# Patient Record
Sex: MALE | Race: WHITE | Employment: FULL TIME | URBAN - METROPOLITAN AREA
[De-identification: names, ages, dates, MRNs, and addresses within clinical notes are randomized per-mention and may not be internally consistent; named-entity substitution may affect disease eponyms.]

---

## 2023-02-15 ENCOUNTER — APPOINTMENT (OUTPATIENT)
Dept: GENERAL RADIOLOGY | Facility: HOSPITAL | Age: 23
End: 2023-02-15
Attending: EMERGENCY MEDICINE
Payer: COMMERCIAL

## 2023-02-15 ENCOUNTER — HOSPITAL ENCOUNTER (EMERGENCY)
Facility: HOSPITAL | Age: 23
Discharge: HOME | End: 2023-02-15
Attending: EMERGENCY MEDICINE
Payer: COMMERCIAL

## 2023-02-15 ENCOUNTER — APPOINTMENT (OUTPATIENT)
Dept: CT IMAGING | Facility: HOSPITAL | Age: 23
End: 2023-02-15
Attending: EMERGENCY MEDICINE
Payer: COMMERCIAL

## 2023-02-15 VITALS
BODY MASS INDEX: 26.21 KG/M2 | DIASTOLIC BLOOD PRESSURE: 87 MMHG | HEART RATE: 60 BPM | RESPIRATION RATE: 17 BRPM | TEMPERATURE: 98 F | HEIGHT: 67 IN | OXYGEN SATURATION: 98 % | SYSTOLIC BLOOD PRESSURE: 149 MMHG | WEIGHT: 167 LBS

## 2023-02-15 DIAGNOSIS — R11.2 NAUSEA AND VOMITING IN ADULT: Primary | ICD-10-CM

## 2023-02-15 LAB
ALBUMIN SERPL-MCNC: 4.7 G/DL (ref 3.4–5)
ALBUMIN/GLOB SERPL: 1.5 {RATIO} (ref 1–2)
ALP LIVER SERPL-CCNC: 83 U/L
ALT SERPL-CCNC: 27 U/L
AMPHET UR QL SCN: NEGATIVE
ANION GAP SERPL CALC-SCNC: 6 MMOL/L (ref 0–18)
AST SERPL-CCNC: 15 U/L (ref 15–37)
BARBITURATES UR QL SCN: NEGATIVE
BASOPHILS # BLD AUTO: 0.04 X10(3) UL (ref 0–0.2)
BASOPHILS NFR BLD AUTO: 0.3 %
BENZODIAZ UR QL SCN: NEGATIVE
BILIRUB SERPL-MCNC: 1.8 MG/DL (ref 0.1–2)
BILIRUB UR QL: NEGATIVE
BUN BLD-MCNC: 13 MG/DL (ref 7–18)
BUN/CREAT SERPL: 12.1 (ref 10–20)
CALCIUM BLD-MCNC: 9.4 MG/DL (ref 8.5–10.1)
CHLORIDE SERPL-SCNC: 105 MMOL/L (ref 98–112)
CK SERPL-CCNC: 113 U/L
CO2 SERPL-SCNC: 27 MMOL/L (ref 21–32)
COCAINE UR QL: NEGATIVE
COLOR UR: YELLOW
CREAT BLD-MCNC: 1.07 MG/DL
CREAT UR-SCNC: 227 MG/DL
DEPRECATED RDW RBC AUTO: 37.7 FL (ref 35.1–46.3)
EOSINOPHIL # BLD AUTO: 0 X10(3) UL (ref 0–0.7)
EOSINOPHIL NFR BLD AUTO: 0 %
ERYTHROCYTE [DISTWIDTH] IN BLOOD BY AUTOMATED COUNT: 11.7 % (ref 11–15)
FLUAV + FLUBV RNA SPEC NAA+PROBE: NEGATIVE
FLUAV + FLUBV RNA SPEC NAA+PROBE: NEGATIVE
GFR SERPLBLD BASED ON 1.73 SQ M-ARVRAT: 99 ML/MIN/1.73M2 (ref 60–?)
GLOBULIN PLAS-MCNC: 3.2 G/DL (ref 2.8–4.4)
GLUCOSE BLD-MCNC: 109 MG/DL (ref 70–99)
GLUCOSE UR-MCNC: NEGATIVE MG/DL
HCT VFR BLD AUTO: 47.1 %
HGB BLD-MCNC: 16.5 G/DL
HGB UR QL STRIP.AUTO: NEGATIVE
IMM GRANULOCYTES # BLD AUTO: 0.05 X10(3) UL (ref 0–1)
IMM GRANULOCYTES NFR BLD: 0.4 %
KETONES UR-MCNC: 80 MG/DL
LEUKOCYTE ESTERASE UR QL STRIP.AUTO: NEGATIVE
LIPASE SERPL-CCNC: 21 U/L (ref 13–75)
LIPASE SERPL-CCNC: 92 U/L (ref 73–393)
LYMPHOCYTES # BLD AUTO: 1.06 X10(3) UL (ref 1–4)
LYMPHOCYTES NFR BLD AUTO: 8.1 %
MCH RBC QN AUTO: 31.3 PG (ref 26–34)
MCHC RBC AUTO-ENTMCNC: 35 G/DL (ref 31–37)
MCV RBC AUTO: 89.2 FL
MDMA UR QL SCN: NEGATIVE
METHADONE UR QL SCN: NEGATIVE
MONOCYTES # BLD AUTO: 0.67 X10(3) UL (ref 0.1–1)
MONOCYTES NFR BLD AUTO: 5.1 %
NEUTROPHILS # BLD AUTO: 11.3 X10 (3) UL (ref 1.5–7.7)
NEUTROPHILS # BLD AUTO: 11.3 X10(3) UL (ref 1.5–7.7)
NEUTROPHILS NFR BLD AUTO: 86.1 %
NITRITE UR QL STRIP.AUTO: NEGATIVE
OPIATES UR QL SCN: NEGATIVE
OSMOLALITY SERPL CALC.SUM OF ELEC: 287 MOSM/KG (ref 275–295)
OXYCODONE UR QL SCN: NEGATIVE
PCP UR QL SCN: NEGATIVE
PH UR: 8 [PH] (ref 5–8)
PLATELET # BLD AUTO: 251 10(3)UL (ref 150–450)
POTASSIUM SERPL-SCNC: 3.8 MMOL/L (ref 3.5–5.1)
PROT SERPL-MCNC: 7.9 G/DL (ref 6.4–8.2)
PROT UR-MCNC: 30 MG/DL
RBC # BLD AUTO: 5.28 X10(6)UL
RSV RNA SPEC NAA+PROBE: NEGATIVE
SARS-COV-2 RNA RESP QL NAA+PROBE: NOT DETECTED
SODIUM SERPL-SCNC: 138 MMOL/L (ref 136–145)
SP GR UR STRIP: 1.03 (ref 1–1.03)
UROBILINOGEN UR STRIP-ACNC: <2
VIT C UR-MCNC: NEGATIVE MG/DL
WBC # BLD AUTO: 13.1 X10(3) UL (ref 4–11)

## 2023-02-15 PROCEDURE — S0028 INJECTION, FAMOTIDINE, 20 MG: HCPCS | Performed by: EMERGENCY MEDICINE

## 2023-02-15 PROCEDURE — 96375 TX/PRO/DX INJ NEW DRUG ADDON: CPT

## 2023-02-15 PROCEDURE — C9113 INJ PANTOPRAZOLE SODIUM, VIA: HCPCS | Performed by: EMERGENCY MEDICINE

## 2023-02-15 PROCEDURE — 81001 URINALYSIS AUTO W/SCOPE: CPT

## 2023-02-15 PROCEDURE — 0241U SARS-COV-2/FLU A AND B/RSV BY PCR (GENEXPERT): CPT | Performed by: EMERGENCY MEDICINE

## 2023-02-15 PROCEDURE — 80307 DRUG TEST PRSMV CHEM ANLYZR: CPT | Performed by: EMERGENCY MEDICINE

## 2023-02-15 PROCEDURE — 99285 EMERGENCY DEPT VISIT HI MDM: CPT

## 2023-02-15 PROCEDURE — 96361 HYDRATE IV INFUSION ADD-ON: CPT

## 2023-02-15 PROCEDURE — 96376 TX/PRO/DX INJ SAME DRUG ADON: CPT

## 2023-02-15 PROCEDURE — 74177 CT ABD & PELVIS W/CONTRAST: CPT | Performed by: EMERGENCY MEDICINE

## 2023-02-15 PROCEDURE — 71045 X-RAY EXAM CHEST 1 VIEW: CPT | Performed by: EMERGENCY MEDICINE

## 2023-02-15 PROCEDURE — 81001 URINALYSIS AUTO W/SCOPE: CPT | Performed by: EMERGENCY MEDICINE

## 2023-02-15 PROCEDURE — 80053 COMPREHEN METABOLIC PANEL: CPT | Performed by: EMERGENCY MEDICINE

## 2023-02-15 PROCEDURE — 83690 ASSAY OF LIPASE: CPT | Performed by: EMERGENCY MEDICINE

## 2023-02-15 PROCEDURE — 85025 COMPLETE CBC W/AUTO DIFF WBC: CPT | Performed by: EMERGENCY MEDICINE

## 2023-02-15 PROCEDURE — 96374 THER/PROPH/DIAG INJ IV PUSH: CPT

## 2023-02-15 PROCEDURE — 82550 ASSAY OF CK (CPK): CPT | Performed by: EMERGENCY MEDICINE

## 2023-02-15 RX ORDER — DIPHENHYDRAMINE HYDROCHLORIDE 50 MG/ML
12.5 INJECTION INTRAMUSCULAR; INTRAVENOUS ONCE
Status: COMPLETED | OUTPATIENT
Start: 2023-02-15 | End: 2023-02-15

## 2023-02-15 RX ORDER — ONDANSETRON 2 MG/ML
4 INJECTION INTRAMUSCULAR; INTRAVENOUS ONCE
Status: COMPLETED | OUTPATIENT
Start: 2023-02-15 | End: 2023-02-15

## 2023-02-15 RX ORDER — DIPHENHYDRAMINE HYDROCHLORIDE 50 MG/ML
50 INJECTION INTRAMUSCULAR; INTRAVENOUS ONCE
Status: COMPLETED | OUTPATIENT
Start: 2023-02-15 | End: 2023-02-15

## 2023-02-15 RX ORDER — HALOPERIDOL 5 MG/ML
5 INJECTION INTRAMUSCULAR ONCE
Status: COMPLETED | OUTPATIENT
Start: 2023-02-15 | End: 2023-02-15

## 2023-02-15 RX ORDER — PROCHLORPERAZINE EDISYLATE 5 MG/ML
10 INJECTION INTRAMUSCULAR; INTRAVENOUS ONCE
Status: COMPLETED | OUTPATIENT
Start: 2023-02-15 | End: 2023-02-15

## 2023-02-15 RX ORDER — PANTOPRAZOLE SODIUM 40 MG/1
40 TABLET, DELAYED RELEASE ORAL DAILY
Qty: 30 TABLET | Refills: 0 | Status: SHIPPED | OUTPATIENT
Start: 2023-02-15 | End: 2023-03-17

## 2023-02-15 RX ORDER — KETOROLAC TROMETHAMINE 15 MG/ML
15 INJECTION, SOLUTION INTRAMUSCULAR; INTRAVENOUS ONCE
Status: COMPLETED | OUTPATIENT
Start: 2023-02-15 | End: 2023-02-15

## 2023-02-15 RX ORDER — FAMOTIDINE 20 MG/1
20 TABLET, FILM COATED ORAL 2 TIMES DAILY
Qty: 28 TABLET | Refills: 0 | Status: SHIPPED | OUTPATIENT
Start: 2023-02-15 | End: 2023-03-01

## 2023-02-15 RX ORDER — METOCLOPRAMIDE HYDROCHLORIDE 5 MG/ML
10 INJECTION INTRAMUSCULAR; INTRAVENOUS ONCE
Status: COMPLETED | OUTPATIENT
Start: 2023-02-15 | End: 2023-02-15

## 2023-02-15 RX ORDER — PROCHLORPERAZINE MALEATE 10 MG
10 TABLET ORAL EVERY 6 HOURS PRN
Qty: 20 TABLET | Refills: 0 | Status: SHIPPED | OUTPATIENT
Start: 2023-02-15 | End: 2023-02-20

## 2023-02-15 RX ORDER — FAMOTIDINE 10 MG/ML
20 INJECTION, SOLUTION INTRAVENOUS ONCE
Status: COMPLETED | OUTPATIENT
Start: 2023-02-15 | End: 2023-02-15

## 2023-02-16 NOTE — ED QUICK NOTES
Pt ambulatory to ED tx room 31 from triage  Pt A&O x 4, answering all ?s appropriately. Pt connected to cont pulse ox & BP. Pt here w/ c/o: Abdominal pain. Pt was at [facility] and referred to ED for further eval.  Pt reporting LUQ abdominal pain & n/v since Friday. Pt was out drinking w/ friends & he ingested a \"white powdery substance\" and has been feeling ill since. Pt endorsing he has had decreased appetite & poor PO intake since. Pt denies cp, sob, fevers/chills. 20g L AC PIV inserted, flushes easily w/ 10cc NS, no s/s infiltration, secured w/ Tegaderm & tape. Labs collected, labeled & bedside, verified using 2 pt identifiers, & sent to lab per standing orders. Cart in low/locked position, side rails up x 2, call light w/ in reach. Lab contacted to request add on of 10 panel UDS. Per , they will add on UDS to UA that's in lab.

## 2023-02-17 ENCOUNTER — HOSPITAL ENCOUNTER (EMERGENCY)
Facility: HOSPITAL | Age: 23
Discharge: HOME | End: 2023-02-17
Attending: EMERGENCY MEDICINE
Payer: COMMERCIAL

## 2023-02-17 VITALS
SYSTOLIC BLOOD PRESSURE: 130 MMHG | OXYGEN SATURATION: 96 % | WEIGHT: 167 LBS | HEIGHT: 67 IN | RESPIRATION RATE: 17 BRPM | DIASTOLIC BLOOD PRESSURE: 72 MMHG | BODY MASS INDEX: 26.21 KG/M2 | TEMPERATURE: 98 F | HEART RATE: 62 BPM

## 2023-02-17 DIAGNOSIS — G24.01 TARDIVE DYSKINESIA: Primary | ICD-10-CM

## 2023-02-17 DIAGNOSIS — E87.6 HYPOKALEMIA: ICD-10-CM

## 2023-02-17 LAB
ANION GAP SERPL CALC-SCNC: 9 MMOL/L (ref 0–18)
BUN BLD-MCNC: 8 MG/DL (ref 7–18)
BUN/CREAT SERPL: 6.4 (ref 10–20)
CALCIUM BLD-MCNC: 8.9 MG/DL (ref 8.5–10.1)
CHLORIDE SERPL-SCNC: 105 MMOL/L (ref 98–112)
CO2 SERPL-SCNC: 26 MMOL/L (ref 21–32)
CREAT BLD-MCNC: 1.25 MG/DL
GFR SERPLBLD BASED ON 1.73 SQ M-ARVRAT: 82 ML/MIN/1.73M2 (ref 60–?)
GLUCOSE BLD-MCNC: 175 MG/DL (ref 70–99)
MAGNESIUM SERPL-MCNC: 2 MG/DL (ref 1.6–2.6)
OSMOLALITY SERPL CALC.SUM OF ELEC: 293 MOSM/KG (ref 275–295)
POTASSIUM SERPL-SCNC: 3.4 MMOL/L (ref 3.5–5.1)
SODIUM SERPL-SCNC: 140 MMOL/L (ref 136–145)

## 2023-02-17 PROCEDURE — 83735 ASSAY OF MAGNESIUM: CPT | Performed by: EMERGENCY MEDICINE

## 2023-02-17 PROCEDURE — 99284 EMERGENCY DEPT VISIT MOD MDM: CPT

## 2023-02-17 PROCEDURE — 96374 THER/PROPH/DIAG INJ IV PUSH: CPT

## 2023-02-17 PROCEDURE — 80048 BASIC METABOLIC PNL TOTAL CA: CPT | Performed by: EMERGENCY MEDICINE

## 2023-02-17 RX ORDER — POTASSIUM CHLORIDE 20 MEQ/1
40 TABLET, EXTENDED RELEASE ORAL ONCE
Status: COMPLETED | OUTPATIENT
Start: 2023-02-17 | End: 2023-02-17

## 2023-02-17 RX ORDER — DIPHENHYDRAMINE HYDROCHLORIDE 50 MG/ML
25 INJECTION INTRAMUSCULAR; INTRAVENOUS ONCE
Status: COMPLETED | OUTPATIENT
Start: 2023-02-17 | End: 2023-02-17

## 2023-02-17 NOTE — DISCHARGE INSTRUCTIONS
Stay hydrated and get rest.    Increase foods high in potassium over the next few days. See primary care for follow-up appointment in the next week. Return to the ER if you develop worsening symptoms or emergent concerns.

## 2023-02-17 NOTE — ED INITIAL ASSESSMENT (HPI)
Patient states he is cramping all over body, was seen the other day in ED for abdominal pain /vomiting getting better per patient. Muscle spasm in feet, hands, jaw and mouth.

## 2023-03-09 ENCOUNTER — HOSPITAL ENCOUNTER (EMERGENCY)
Facility: HOSPITAL | Age: 23
Discharge: HOME | End: 2023-03-09
Payer: COMMERCIAL

## 2023-03-09 ENCOUNTER — APPOINTMENT (OUTPATIENT)
Dept: CT IMAGING | Facility: HOSPITAL | Age: 23
End: 2023-03-09
Attending: NURSE PRACTITIONER
Payer: COMMERCIAL

## 2023-03-09 VITALS
DIASTOLIC BLOOD PRESSURE: 83 MMHG | WEIGHT: 170 LBS | HEART RATE: 74 BPM | RESPIRATION RATE: 18 BRPM | HEIGHT: 68 IN | SYSTOLIC BLOOD PRESSURE: 110 MMHG | BODY MASS INDEX: 25.76 KG/M2 | OXYGEN SATURATION: 100 % | TEMPERATURE: 98 F

## 2023-03-09 DIAGNOSIS — R10.13 EPIGASTRIC PAIN: Primary | ICD-10-CM

## 2023-03-09 LAB
ALBUMIN SERPL-MCNC: 4.5 G/DL (ref 3.4–5)
ALBUMIN/GLOB SERPL: 1.4 {RATIO} (ref 1–2)
ALP LIVER SERPL-CCNC: 78 U/L
ALT SERPL-CCNC: 25 U/L
ANION GAP SERPL CALC-SCNC: 10 MMOL/L (ref 0–18)
AST SERPL-CCNC: 14 U/L (ref 15–37)
BASOPHILS # BLD AUTO: 0.03 X10(3) UL (ref 0–0.2)
BASOPHILS NFR BLD AUTO: 0.2 %
BILIRUB SERPL-MCNC: 1.7 MG/DL (ref 0.1–2)
BILIRUB UR QL: NEGATIVE
BUN BLD-MCNC: 18 MG/DL (ref 7–18)
BUN/CREAT SERPL: 15.1 (ref 10–20)
CALCIUM BLD-MCNC: 9.8 MG/DL (ref 8.5–10.1)
CHLORIDE SERPL-SCNC: 104 MMOL/L (ref 98–112)
CLARITY UR: CLEAR
CO2 SERPL-SCNC: 25 MMOL/L (ref 21–32)
COLOR UR: YELLOW
CREAT BLD-MCNC: 1.19 MG/DL
DEPRECATED RDW RBC AUTO: 39.6 FL (ref 35.1–46.3)
EOSINOPHIL # BLD AUTO: 0.03 X10(3) UL (ref 0–0.7)
EOSINOPHIL NFR BLD AUTO: 0.2 %
ERYTHROCYTE [DISTWIDTH] IN BLOOD BY AUTOMATED COUNT: 12.3 % (ref 11–15)
GFR SERPLBLD BASED ON 1.73 SQ M-ARVRAT: 87 ML/MIN/1.73M2 (ref 60–?)
GLOBULIN PLAS-MCNC: 3.2 G/DL (ref 2.8–4.4)
GLUCOSE BLD-MCNC: 131 MG/DL (ref 70–99)
GLUCOSE UR-MCNC: NEGATIVE MG/DL
HCT VFR BLD AUTO: 46.9 %
HGB BLD-MCNC: 16.6 G/DL
HGB UR QL STRIP.AUTO: NEGATIVE
IMM GRANULOCYTES # BLD AUTO: 0.08 X10(3) UL (ref 0–1)
IMM GRANULOCYTES NFR BLD: 0.5 %
KETONES UR-MCNC: 80 MG/DL
LEUKOCYTE ESTERASE UR QL STRIP.AUTO: NEGATIVE
LIPASE SERPL-CCNC: 20 U/L (ref 13–75)
LIPASE SERPL-CCNC: 88 U/L (ref 73–393)
LYMPHOCYTES # BLD AUTO: 1.49 X10(3) UL (ref 1–4)
LYMPHOCYTES NFR BLD AUTO: 9.3 %
MCH RBC QN AUTO: 31.1 PG (ref 26–34)
MCHC RBC AUTO-ENTMCNC: 35.4 G/DL (ref 31–37)
MCV RBC AUTO: 88 FL
MONOCYTES # BLD AUTO: 1.06 X10(3) UL (ref 0.1–1)
MONOCYTES NFR BLD AUTO: 6.6 %
NEUTROPHILS # BLD AUTO: 13.26 X10 (3) UL (ref 1.5–7.7)
NEUTROPHILS # BLD AUTO: 13.26 X10(3) UL (ref 1.5–7.7)
NEUTROPHILS NFR BLD AUTO: 83.2 %
NITRITE UR QL STRIP.AUTO: NEGATIVE
OSMOLALITY SERPL CALC.SUM OF ELEC: 292 MOSM/KG (ref 275–295)
PH UR: 6 [PH] (ref 5–8)
PLATELET # BLD AUTO: 225 10(3)UL (ref 150–450)
POTASSIUM SERPL-SCNC: 3.7 MMOL/L (ref 3.5–5.1)
PROT SERPL-MCNC: 7.7 G/DL (ref 6.4–8.2)
PROT UR-MCNC: 30 MG/DL
RBC # BLD AUTO: 5.33 X10(6)UL
SODIUM SERPL-SCNC: 139 MMOL/L (ref 136–145)
SP GR UR STRIP: 1.03 (ref 1–1.03)
UROBILINOGEN UR STRIP-ACNC: <2
VIT C UR-MCNC: NEGATIVE MG/DL
WBC # BLD AUTO: 16 X10(3) UL (ref 4–11)

## 2023-03-09 PROCEDURE — 80053 COMPREHEN METABOLIC PANEL: CPT

## 2023-03-09 PROCEDURE — 96375 TX/PRO/DX INJ NEW DRUG ADDON: CPT

## 2023-03-09 PROCEDURE — 74177 CT ABD & PELVIS W/CONTRAST: CPT | Performed by: NURSE PRACTITIONER

## 2023-03-09 PROCEDURE — 85025 COMPLETE CBC W/AUTO DIFF WBC: CPT

## 2023-03-09 PROCEDURE — 99285 EMERGENCY DEPT VISIT HI MDM: CPT

## 2023-03-09 PROCEDURE — 99284 EMERGENCY DEPT VISIT MOD MDM: CPT

## 2023-03-09 PROCEDURE — 96374 THER/PROPH/DIAG INJ IV PUSH: CPT

## 2023-03-09 PROCEDURE — 83690 ASSAY OF LIPASE: CPT

## 2023-03-09 PROCEDURE — 81001 URINALYSIS AUTO W/SCOPE: CPT

## 2023-03-09 RX ORDER — METOCLOPRAMIDE HYDROCHLORIDE 5 MG/ML
10 INJECTION INTRAMUSCULAR; INTRAVENOUS ONCE
Status: COMPLETED | OUTPATIENT
Start: 2023-03-09 | End: 2023-03-09

## 2023-03-09 RX ORDER — DIPHENHYDRAMINE HYDROCHLORIDE 50 MG/ML
25 INJECTION INTRAMUSCULAR; INTRAVENOUS ONCE
Status: COMPLETED | OUTPATIENT
Start: 2023-03-09 | End: 2023-03-09

## 2023-03-09 RX ORDER — FAMOTIDINE 20 MG/1
20 TABLET, FILM COATED ORAL 2 TIMES DAILY PRN
Qty: 30 TABLET | Refills: 0 | Status: SHIPPED | OUTPATIENT
Start: 2023-03-09 | End: 2023-04-08

## 2023-03-09 NOTE — ED QUICK NOTES
Patient ambulatory to ER room 57. C/o left sided abd pain with nausea/vomiting/diarrhea that started a few days ago. Patient stated he has been vomiting bile with streaks of blood. Patient appearing very uncomfortable in the stretcher. + belching. PIV established, labs drawn and sent down.

## 2023-03-09 NOTE — ED INITIAL ASSESSMENT (HPI)
Pt to ED /w c/o left sided abd pain that's been going on for a month was recently seen and dx with IBS. Pt endorses NVD. Pt is axox4. Denies fevers.

## 2024-05-20 ENCOUNTER — APPOINTMENT (OUTPATIENT)
Dept: CT IMAGING | Facility: HOSPITAL | Age: 24
End: 2024-05-20
Attending: NURSE PRACTITIONER

## 2024-05-20 ENCOUNTER — HOSPITAL ENCOUNTER (EMERGENCY)
Facility: HOSPITAL | Age: 24
Discharge: HOME | End: 2024-05-20

## 2024-05-20 VITALS
BODY MASS INDEX: 26 KG/M2 | RESPIRATION RATE: 22 BRPM | SYSTOLIC BLOOD PRESSURE: 126 MMHG | WEIGHT: 170.63 LBS | HEART RATE: 98 BPM | OXYGEN SATURATION: 100 % | TEMPERATURE: 98 F | DIASTOLIC BLOOD PRESSURE: 77 MMHG

## 2024-05-20 DIAGNOSIS — M54.50 LOW BACK PAIN AT MULTIPLE SITES: Primary | ICD-10-CM

## 2024-05-20 LAB
ALBUMIN SERPL-MCNC: 5.1 G/DL (ref 3.2–4.8)
ALBUMIN/GLOB SERPL: 2 {RATIO} (ref 1–2)
ALP LIVER SERPL-CCNC: 82 U/L
ALT SERPL-CCNC: 19 U/L
ANION GAP SERPL CALC-SCNC: 10 MMOL/L (ref 0–18)
AST SERPL-CCNC: 22 U/L (ref ?–34)
BASOPHILS # BLD AUTO: 0.03 X10(3) UL (ref 0–0.2)
BASOPHILS NFR BLD AUTO: 0.2 %
BILIRUB SERPL-MCNC: 1.1 MG/DL (ref 0.3–1.2)
BILIRUB UR QL: NEGATIVE
BUN BLD-MCNC: 12 MG/DL (ref 9–23)
BUN/CREAT SERPL: 12.4 (ref 10–20)
CALCIUM BLD-MCNC: 9.8 MG/DL (ref 8.7–10.4)
CHLORIDE SERPL-SCNC: 107 MMOL/L (ref 98–112)
CLARITY UR: CLEAR
CO2 SERPL-SCNC: 25 MMOL/L (ref 21–32)
CREAT BLD-MCNC: 0.97 MG/DL
DEPRECATED RDW RBC AUTO: 39.9 FL (ref 35.1–46.3)
EGFRCR SERPLBLD CKD-EPI 2021: 111 ML/MIN/1.73M2 (ref 60–?)
EOSINOPHIL # BLD AUTO: 0.03 X10(3) UL (ref 0–0.7)
EOSINOPHIL NFR BLD AUTO: 0.2 %
ERYTHROCYTE [DISTWIDTH] IN BLOOD BY AUTOMATED COUNT: 12.1 % (ref 11–15)
GLOBULIN PLAS-MCNC: 2.6 G/DL (ref 2–3.5)
GLUCOSE BLD-MCNC: 138 MG/DL (ref 70–99)
GLUCOSE UR-MCNC: NORMAL MG/DL
HCT VFR BLD AUTO: 46.5 %
HGB BLD-MCNC: 16 G/DL
HGB UR QL STRIP.AUTO: NEGATIVE
IMM GRANULOCYTES # BLD AUTO: 0.07 X10(3) UL (ref 0–1)
IMM GRANULOCYTES NFR BLD: 0.4 %
KETONES UR-MCNC: NEGATIVE MG/DL
LEUKOCYTE ESTERASE UR QL STRIP.AUTO: NEGATIVE
LIPASE SERPL-CCNC: 35 U/L (ref 13–75)
LYMPHOCYTES # BLD AUTO: 0.99 X10(3) UL (ref 1–4)
LYMPHOCYTES NFR BLD AUTO: 6.3 %
MCH RBC QN AUTO: 30.8 PG (ref 26–34)
MCHC RBC AUTO-ENTMCNC: 34.4 G/DL (ref 31–37)
MCV RBC AUTO: 89.4 FL
MONOCYTES # BLD AUTO: 0.51 X10(3) UL (ref 0.1–1)
MONOCYTES NFR BLD AUTO: 3.3 %
NEUTROPHILS # BLD AUTO: 14.01 X10 (3) UL (ref 1.5–7.7)
NEUTROPHILS # BLD AUTO: 14.01 X10(3) UL (ref 1.5–7.7)
NEUTROPHILS NFR BLD AUTO: 89.6 %
NITRITE UR QL STRIP.AUTO: NEGATIVE
OSMOLALITY SERPL CALC.SUM OF ELEC: 296 MOSM/KG (ref 275–295)
PH UR: 7 [PH] (ref 5–8)
PLATELET # BLD AUTO: 233 10(3)UL (ref 150–450)
POTASSIUM SERPL-SCNC: 3.8 MMOL/L (ref 3.5–5.1)
PROT SERPL-MCNC: 7.7 G/DL (ref 5.7–8.2)
RBC # BLD AUTO: 5.2 X10(6)UL
SODIUM SERPL-SCNC: 142 MMOL/L (ref 136–145)
SP GR UR STRIP: 1.02 (ref 1–1.03)
UROBILINOGEN UR STRIP-ACNC: NORMAL
WBC # BLD AUTO: 15.6 X10(3) UL (ref 4–11)

## 2024-05-20 PROCEDURE — 99284 EMERGENCY DEPT VISIT MOD MDM: CPT

## 2024-05-20 PROCEDURE — 96375 TX/PRO/DX INJ NEW DRUG ADDON: CPT

## 2024-05-20 PROCEDURE — 81003 URINALYSIS AUTO W/O SCOPE: CPT | Performed by: NURSE PRACTITIONER

## 2024-05-20 PROCEDURE — 85025 COMPLETE CBC W/AUTO DIFF WBC: CPT | Performed by: NURSE PRACTITIONER

## 2024-05-20 PROCEDURE — 80053 COMPREHEN METABOLIC PANEL: CPT | Performed by: NURSE PRACTITIONER

## 2024-05-20 PROCEDURE — 83690 ASSAY OF LIPASE: CPT | Performed by: NURSE PRACTITIONER

## 2024-05-20 PROCEDURE — 74177 CT ABD & PELVIS W/CONTRAST: CPT | Performed by: NURSE PRACTITIONER

## 2024-05-20 PROCEDURE — 96374 THER/PROPH/DIAG INJ IV PUSH: CPT

## 2024-05-20 RX ORDER — ONDANSETRON 2 MG/ML
INJECTION INTRAMUSCULAR; INTRAVENOUS
Status: COMPLETED
Start: 2024-05-20 | End: 2024-05-20

## 2024-05-20 RX ORDER — ONDANSETRON 2 MG/ML
4 INJECTION INTRAMUSCULAR; INTRAVENOUS ONCE
Status: COMPLETED | OUTPATIENT
Start: 2024-05-20 | End: 2024-05-20

## 2024-05-20 RX ORDER — KETOROLAC TROMETHAMINE 15 MG/ML
15 INJECTION, SOLUTION INTRAMUSCULAR; INTRAVENOUS ONCE
Status: COMPLETED | OUTPATIENT
Start: 2024-05-20 | End: 2024-05-20

## 2024-05-20 RX ORDER — ONDANSETRON 4 MG/1
4 TABLET, ORALLY DISINTEGRATING ORAL EVERY 4 HOURS PRN
Qty: 10 TABLET | Refills: 0 | Status: SHIPPED | OUTPATIENT
Start: 2024-05-20 | End: 2024-05-27

## 2024-05-20 RX ORDER — CYCLOBENZAPRINE HCL 10 MG
10 TABLET ORAL 3 TIMES DAILY PRN
Qty: 20 TABLET | Refills: 0 | Status: SHIPPED | OUTPATIENT
Start: 2024-05-20 | End: 2024-05-27

## 2024-05-20 NOTE — ED PROVIDER NOTES
Patient Seen in: [facility] Emergency Department      History     Chief Complaint   Patient presents with    Abdomen/Flank Pain     Stated Complaint: Abdominla Pain    Subjective:   22yo/m w no reported medical problems reports with left lower back pain radiating to left lower abdomen. He was seen at an [facility] in [place] and advised ED eval. No chest pain. NO fever. No diarrhea. No weakness. No sick contacts. No recent abx use.             Objective:   History reviewed. No pertinent past medical history.           History reviewed. No pertinent surgical history.             Social History     Socioeconomic History    Marital status: Single   Tobacco Use    Smoking status: Never    Smokeless tobacco: Never   Substance and Sexual Activity    Alcohol use: Never    Drug use: Yes     Types: Cannabis              Review of Systems   All other systems reviewed and are negative.      Positive for stated complaint: Abdominla Pain  Other systems are as noted in HPI.  Constitutional and vital signs reviewed.      All other systems reviewed and negative except as noted above.    Physical Exam     ED Triage Vitals [05/20/24 1327]   /88   Pulse 98   Resp 22   Temp 98.4 °F (36.9 °C)   Temp src Oral   SpO2 99 %   O2 Device None (Room air)       Current Vitals:   Vital Signs  BP: 146/88  Pulse: 98  Resp: 22  Temp: 98.4 °F (36.9 °C)  Temp src: Oral    Oxygen Therapy  SpO2: 99 %  O2 Device: None (Room air)            Physical Exam  Vitals and nursing note reviewed.   Constitutional:       General: He is not in acute distress.     Appearance: He is well-developed.   HENT:      Head: Normocephalic and atraumatic.      Nose: Nose normal.      Mouth/Throat:      Mouth: Mucous membranes are moist.   Eyes:      Conjunctiva/sclera: Conjunctivae normal.      Pupils: Pupils are equal, round, and reactive to light.   Cardiovascular:      Rate and Rhythm: Normal rate and regular rhythm.      Heart sounds: Normal  heart sounds.   Pulmonary:      Effort: Pulmonary effort is normal.      Breath sounds: Normal breath sounds.   Abdominal:      General: Bowel sounds are normal.      Palpations: Abdomen is soft.      Tenderness: There is abdominal tenderness in the left lower quadrant. There is left CVA tenderness.   Genitourinary:     Testes:         Left: Tenderness not present.   Musculoskeletal:         General: No tenderness or deformity. Normal range of motion.      Cervical back: Normal range of motion and neck supple.   Skin:     General: Skin is warm and dry.      Capillary Refill: Capillary refill takes less than 2 seconds.      Findings: No rash.      Comments: Normal color   Neurological:      General: No focal deficit present.      Mental Status: He is alert and oriented to person, place, and time.      GCS: GCS eye subscore is 4. GCS verbal subscore is 5. GCS motor subscore is 6.      Cranial Nerves: No cranial nerve deficit.      Gait: Gait normal.               ED Course     Labs Reviewed   COMP METABOLIC PANEL (14) - Abnormal; Notable for the following components:       Result Value    Glucose 138 (*)     Calculated Osmolality 296 (*)     Albumin 5.1 (*)     All other components within normal limits   URINALYSIS WITH CULTURE REFLEX - Abnormal; Notable for the following components:    Protein Urine Trace (*)     All other components within normal limits   CBC W/ DIFFERENTIAL - Abnormal; Notable for the following components:    WBC 15.6 (*)     Neutrophil Absolute Prelim 14.01 (*)     Neutrophil Absolute 14.01 (*)     Lymphocyte Absolute 0.99 (*)     All other components within normal limits   LIPASE - Normal   CBC WITH DIFFERENTIAL WITH PLATELET    Narrative:     The following orders were created for panel order CBC With Differential With Platelet.  Procedure                               Abnormality         Status                     ---------                               -----------         ------                      CBC W/ DIFFERENTIAL[[id number]]          Abnormal            Final result                 Please view results for these tests on the individual orders.   RAINBOW DRAW BLUE             Impression  CONCLUSION:     1. No bowel obstruction, acute appendicitis, or acute diverticulitis.  2. Mild distal esophageal wall thickening, which may be secondary to recent vomiting, esophagitis, or gastroesophageal reflux disease.  3. Mild-to-moderate colonic stool burden, which can be seen in the setting of constipation.  4. No hydronephrosis or urinary calculus.  5. Lesser incidental findings described above.           Dictated by (CST): Yo Major MD on 5/20/2024 at 3:14 PM      Finalized by (CST): Yo Major MD on 5/20/2024 at 3:21 PM             MDM                  Medical Decision Making  22yo/m w hx and exam as stated; abd/back pain    Non toxic, well appearing  Afebrile  Tolerating po  No urinary symptoms  No cough or congestion  Ct non acute  Labs non acute    Plan  Dc to home  Close fu      Amount and/or Complexity of Data Reviewed  Labs:  Decision-making details documented in ED Course.  Radiology:  Decision-making details documented in ED Course.    Risk  OTC drugs.  Prescription drug management.        Disposition and Plan     Clinical Impression:  1. Low back pain at multiple sites         Disposition:  Discharge  5/20/2024  3:36 pm    Follow-up:  Bandar Oakes [address] [address]  [phone]    Follow up in 2 day(s)            Medications Prescribed:  Current Discharge Medication List        START taking these medications    Details   ondansetron 4 MG Oral Tablet Dispersible Take 1 tablet (4 mg total) by mouth every 4 (four) hours as needed for Nausea.  Qty: 10 tablet, Refills: 0      cyclobenzaprine 10 MG Oral Tab Take 1 tablet (10 mg total) by mouth 3 (three) times daily as needed for Muscle spasms.  Qty: 20 tablet, Refills: 0

## 2024-05-20 NOTE — ED INITIAL ASSESSMENT (HPI)
Pt to ED from [facility] with c/o left sided abdominal pain that started this am. +vomiting. Pt pale and skin clammy. No respiratory distress noted. Pt is alert and oriented x4. Pt ambulating by self with steady gait. Pt states +constipation. Denies hematuria or dysuria. Pt denies fall or trauma.

## 2025-06-11 NOTE — PROGRESS NOTES
[facility]  Part of [facility]  Urology Clinic Note    Today I had the pleasure of seeing Hernandez Graves in my clinic. Mr. Graves is a pleasant 24 year old male who I am seeing for frequent urination, clear discharge, STD testing came back negative. Patient was last seen in this department on Visit date not found.    Symptoms of balanitis secondary from fluid leaking throughout the day from the urethra. Went to , gave him antifungal/antibacterial creams (mupirocin and miconazole). Improvement of balanitis.    Erectile dysfunction related to the difficulty in retracting the foreskin and foreskin being tighter.    Frequency. Feeling of incomplete emptying. Not bothered by urinary symptoms.    Did a warm bath, which helped him with all of his symptoms.    IPSS score is 13, QoL score of -.   PVR 1 mL.  Urine dip: negative blood, negative nitrite, negative leuk.      PAST MEDICAL HISTORY:  Past Medical History[1]     PAST SURGICAL HISTORY:  Past Surgical History[2]    ALLERGIES:  Allergies[3]      MEDICATIONS:  No current outpatient medications     FAMILY HISTORY:  Family History[4]     SOCIAL HISTORY:  Short Social Hx on File[5]       PHYSICAL EXAMINATION:  There were no vitals taken for this visit.  General: No acute distress, cooperative and communicative, alert and oriented  HEENT: Normocephalic, atraumatic, moist mucous membranes, no discharge, no gross neck abnormalities  Respiratory: No respiratory distress. Chest expansion equal bilaterally.  Penile meatus: open and in normal location  Penis: moderate difficulty with retracting the foreskin and white blotches on the glans consistent with possible developing balanitis  Scrotum: grossly normal    REVIEW OF SYSTEMS:  A comprehensive 10-point review of systems was completed.  Pertinent positives and negatives are noted in the the HPI.       LABORATORY DATA:  Urine Culture Results (last three years):  No results found for: \"URINECUL\"  No results  found for: \"PSA\", \"QPSA\", \"TOTPSASCREEN\"  Lab Results   Component Value Date    WBC 15.6 (H) 05/20/2024    RBC 5.20 05/20/2024    HGB 16.0 05/20/2024    HCT 46.5 05/20/2024    MCV 89.4 05/20/2024    MCH 30.8 05/20/2024    MCHC 34.4 05/20/2024    RDW 12.1 05/20/2024    .0 05/20/2024     Lab Results   Component Value Date     (H) 05/20/2024    BUN 12 05/20/2024    BUNCREA 12.4 05/20/2024    CREATSERUM 0.97 05/20/2024    ANIONGAP 10 05/20/2024    CA 9.8 05/20/2024     05/20/2024    K 3.8 05/20/2024     05/20/2024    CO2 25.0 05/20/2024     No results found for: \"PTP\", \"PT\", \"INR\"  Reviewed       IMAGING REVIEW:  No results found.  5/20/2024 CT A/P with contrast:  CONCLUSION:   1. No bowel obstruction, acute appendicitis, or acute diverticulitis.   2. Mild distal esophageal wall thickening, which may be secondary to recent vomiting, esophagitis, or gastroesophageal reflux disease.   3. Mild-to-moderate colonic stool burden, which can be seen in the setting of constipation.   4. No hydronephrosis or urinary calculus.   5. Lesser incidental findings described above.      IMPRESSION:   #Urinary frequency  Behavioral management includes timed voiding (going to the bathroom on a schedule every 1-4 hours), double voiding (trying to pee twice), avoiding bladder irritants (coffee, tea, soda, pop, alcohol), Kegel exercises, compression stockings, elevation of feet, voiding prior to bedtime, avoiding fluids 2 to 4 hours before bedtime and constipation management.     For constipation management, consider fruits (especially ones that start with \"P\"), vegetables, flaxseeds, hydration, fiber, MiraLAX, Colace or senna. Also can use a squatty potty to help with efficiency of stooling.    If there are no improvement in symptoms at follow-up, we can proceed with trial of medical therapy versus further testing including a cystoscopy and possible urodynamic evaluation.    #Post void dribbling vs clear urethral  discharge  #Balanitis     PLAN:  #Urinary frequency  - Bladder behaviors  - Urine vikor extended    #Post void dribbling  - Education materials given  - If still bothersome on follow up, can give PFPT referral    #Balanitis  - Lotrisone cream BID x 10-14 days  - Not interested in circumcision at this time  - Treat symptomatically / follow up as needed    Armando Javier PA-C    The 21st Century Cures Act makes medical notes available to patients in the interest of transparency.  However, please be advised that this is a medical document.  It is intended as a peer to peer communication.  It is written in medical language and may contain abbreviations or verbiage that are technical and unfamiliar.  It may appear blunt or direct.  Medical documents are intended to carry relevant information, facts as evident, and the clinical opinion of the practitioner.         [1] No past medical history on file.  [2] No past surgical history on file.  [3] No Known Allergies  [4] No family history on file.  [5]   Social History  Socioeconomic History    Marital status: Single   Tobacco Use    Smoking status: Never    Smokeless tobacco: Never   Substance and Sexual Activity    Alcohol use: Never    Drug use: Yes     Types: Cannabis

## 2025-06-12 ENCOUNTER — TELEPHONE (OUTPATIENT)
Dept: SURGERY | Facility: CLINIC | Age: 25
End: 2025-06-12

## 2025-06-12 ENCOUNTER — OFFICE VISIT (OUTPATIENT)
Dept: SURGERY | Facility: CLINIC | Age: 25
End: 2025-06-12

## 2025-06-12 DIAGNOSIS — N48.1 BALANITIS: Primary | ICD-10-CM

## 2025-06-12 DIAGNOSIS — N39.43 POST-VOID DRIBBLING: ICD-10-CM

## 2025-06-12 DIAGNOSIS — R35.0 URINARY FREQUENCY: ICD-10-CM

## 2025-06-12 PROCEDURE — 99205 OFFICE O/P NEW HI 60 MIN: CPT | Performed by: PHYSICIAN ASSISTANT

## 2025-06-12 RX ORDER — CLOTRIMAZOLE AND BETAMETHASONE DIPROPIONATE 10; .64 MG/G; MG/G
1 CREAM TOPICAL 2 TIMES DAILY
Qty: 45 G | Refills: 1 | Status: SHIPPED | OUTPATIENT
Start: 2025-06-12

## 2025-06-12 NOTE — PATIENT INSTRUCTIONS
Post-Void Dribbling    Why do I leak after voiding?  A more common question than many people realize. Post-Void Dribbling (PVD or more formally Post-Micturition Dribbling) occurs when you leak urine after urinating. Basically you sit down to empty your bladder, then you stand up and you pee or void a little bit. Or if you are a lavon you urinate, you think you have emptied your bladder, and still a little bit more dribbles out after you think you are done.  Functionally, when you stand up, there is a little bit of urine left in the urethra, which empties as you stand and gravity takes over. You weren't able to fully relax your pelvic floor muscles and completely void. (If you have pinched a straw with water in it and let go, a similar thing is happening).      Options for Post-Void Dribbling    Unfortunately, no medications have been shown to be effective in treating PVD. However, there are several strategies listed below which have been shown to help.    1.  Spending more time    Many men find that by just spending an extra 10-20 seconds at the urinal/toilet the remaining urine will empty and they will avoid the embarrassment associated with PVD. If you sit to urinate you may want to stand after you void and try to void again to see if any more urine comes out.    2.  Milking the Urethra  Some men find it helpful to manually push the last few drops of urine from their urethra. Follow the steps below  After passing urine, wait for a few seconds to allow the bladder to empty  Place the fingertips of the left hand three finger-breadths behind the scrotum and apply gentle pressure  Keeping the pressure in the mid line, gently but positively draw the fingers forwards towards the base of the penis under the scrotum  This pushes the urine forward into the penile urethra from where it can be emptied by shaking or squeezing in the usual way  Before leaving the toilet, repeat the technique twice to ensure that the urethra is  completely empty  This technique can be practiced at home. When in public toilets it can be done discreetly, with a hand inside a trouser pocket. It only takes a few seconds and may avoid the problem of wet trousers.    3. Double Voiding  If you are leaking urine after urinating, a common recommendation is double voiding. Double voiding just means you void and then you try to push out a little more urine. However, it may be the case that your pelvic floor muscles are too tight and that you're not actually fully emptying. In this case, pushing harder or more is not going to lead to get more urine out because you pelvic floor is too tight. So, even with double voiding leaking after you pee can still occur.    4. Kegel Exercises  These should be done regularly (three sets per day, at least 5-7 exercises each time) to help strengthen your sphincter as well as the bulbocavernous muscles of the penis.  Please note that while Kegel exercises have been shown to be helpful that many men do not experience a benefit right away. It may take several weeks or even months to see improvement.    5. Post-void Kegel  In addition to practicing Kegels when you are not actively voiding you may also do a Kegel exercise right after you have completely emptied your bladder as this can help push the remaining urine out of the urethra.    6. Pelvic Floor Therapy  Pelvic floor exercises have been clinically shown to reduce post-void dribbling in a majority of men who do the exercises faithfully.   A Pelvic Floor Physical Therapist can teach you and make sure you are properly doing these exercises. If done correctly this can help you improve both bladder and bowel control and resolve or improve leakage. When a muscle is not exercised it will weaken through lack of use. Like any other muscles in the body, the more you use and exercise them, the stronger the muscles will be. The pelvic floor muscles are no exception.

## 2025-06-16 ENCOUNTER — TELEPHONE (OUTPATIENT)
Dept: SURGERY | Facility: CLINIC | Age: 25
End: 2025-06-16

## 2025-06-16 NOTE — TELEPHONE ENCOUNTER
I called patient and no voicemail is set up, patients brother who is in verbal release was called and asked if he could have his brother (patient) call urology back. Patients brother didn't ask any questions and stated he would let his brother know.

## 2025-06-16 NOTE — TELEPHONE ENCOUNTER
Please let the patient know that we received the urine Vikor results from 6/12.  It does not show any signs of infection.